# Patient Record
Sex: MALE | URBAN - METROPOLITAN AREA
[De-identification: names, ages, dates, MRNs, and addresses within clinical notes are randomized per-mention and may not be internally consistent; named-entity substitution may affect disease eponyms.]

---

## 2021-04-15 NOTE — PATIENT DISCUSSION
Recommend observation. [Wheezing] : wheezing [Cough] : cough [Joint Stiffness] : joint stiffness [Dizziness] : dizziness [Depression] : depression [Negative] : Heme/Lymph [Shortness Of Breath] : no shortness of breath [Joint Pain] : no joint pain [Muscle Pain] : no muscle pain [Muscle Weakness] : no muscle weakness [Headache] : no headaches [Fainting] : no fainting [Difficulty Walking] : no difficulty walking [Insomnia] : no insomnia [Suicidal] : not suicidal [Anxiety] : no anxiety

## 2021-04-20 NOTE — PATIENT DISCUSSION
ok to proceed with IOL due to [address] for sx made today with KMS and goal: CV -2.00. pt ed will likely need glasses for night driving to help with glare. Hx succ. MonoV with Lasik OS as near.

## 2021-04-22 NOTE — PATIENT DISCUSSION
No retinal detachment or retinal tear noted. Is This A New Presentation, Or A Follow-Up?: Skin Lesion How Severe Is Your Skin Lesion?: moderate Have Your Skin Lesions Been Treated?: not been treated

## 2021-04-23 NOTE — PATIENT DISCUSSION
----- Message from Maria Luz London sent at 7/25/2017 12:47 PM CDT -----  Contact: [facility]   Caller states that she need to know the date and results of pt A1C test and if one haven't been done in the last 3 months she need orders to have one done. Caller states that this is a medicare guideline that.   ..[phone]     1 week PO: Patient is doing well post-operatively. The importance of post-op drop compliance was emphasized. Drop scheduled reviewed with patient. Patient to call if any visual changes or concerns.

## 2021-09-10 ENCOUNTER — IMPORTED ENCOUNTER (OUTPATIENT)
Dept: URBAN - METROPOLITAN AREA CLINIC 31 | Facility: CLINIC | Age: 36
End: 2021-09-10

## 2021-09-10 PROBLEM — H46: Noted: 2021-09-10

## 2021-09-10 PROBLEM — H47.213: Noted: 2021-09-10

## 2021-09-10 PROCEDURE — 92083 EXTENDED VISUAL FIELD XM: CPT

## 2021-09-10 PROCEDURE — 92004 COMPRE OPH EXAM NEW PT 1/>: CPT

## 2021-09-10 NOTE — PATIENT DISCUSSION
Optic Atrophy OU  -- The patient has optic atrophy of both eyes which is the likely cause of his/her decreased vision. The condition was explained to the patient. MSHVF  - totally unreliable OS.  Makes no sense OU - severe bilateral constriction while CVF is fill bilaterally and ambulated without difficulty

## 2022-04-02 ASSESSMENT — TONOMETRY
OS_IOP_MMHG: 13
OD_IOP_MMHG: 14

## 2022-04-02 ASSESSMENT — VISUAL ACUITY
OS_SC: 20/50
OS_PH: SC 20/70
OS_CC: 20/100
OD_SC: 20/200

## 2023-10-30 ENCOUNTER — TELEPHONE ENCOUNTER (OUTPATIENT)
Dept: URBAN - METROPOLITAN AREA CLINIC 64 | Facility: CLINIC | Age: 38
End: 2023-10-30

## 2023-11-29 ENCOUNTER — TELEPHONE ENCOUNTER (OUTPATIENT)
Dept: URBAN - METROPOLITAN AREA CLINIC 63 | Facility: CLINIC | Age: 38
End: 2023-11-29

## 2023-12-04 ENCOUNTER — OFFICE VISIT (OUTPATIENT)
Dept: URBAN - METROPOLITAN AREA CLINIC 60 | Facility: CLINIC | Age: 38
End: 2023-12-04
Payer: MEDICARE

## 2023-12-04 VITALS
TEMPERATURE: 98 F | BODY MASS INDEX: 24.37 KG/M2 | HEART RATE: 59 BPM | SYSTOLIC BLOOD PRESSURE: 116 MMHG | DIASTOLIC BLOOD PRESSURE: 78 MMHG | WEIGHT: 160.8 LBS | OXYGEN SATURATION: 99 % | HEIGHT: 68 IN

## 2023-12-04 DIAGNOSIS — R19.4 CHANGE IN BOWEL HABITS: ICD-10-CM

## 2023-12-04 DIAGNOSIS — R63.4 UNINTENTIONAL WEIGHT LOSS: ICD-10-CM

## 2023-12-04 DIAGNOSIS — R13.10 DYSPHAGIA, UNSPECIFIED TYPE: ICD-10-CM

## 2023-12-04 DIAGNOSIS — R10.13 DYSPEPSIA: ICD-10-CM

## 2023-12-04 PROBLEM — 440630006: Status: ACTIVE | Noted: 2023-12-04

## 2023-12-04 PROBLEM — 40739000: Status: ACTIVE | Noted: 2023-12-04

## 2023-12-04 PROBLEM — 235595009: Status: ACTIVE | Noted: 2023-12-04

## 2023-12-04 PROCEDURE — 99204 OFFICE O/P NEW MOD 45 MIN: CPT

## 2023-12-04 RX ORDER — MIRTAZAPINE 15 MG/1
1 TABLET AT BEDTIME TABLET, FILM COATED ORAL ONCE A DAY
Status: ACTIVE | COMMUNITY

## 2023-12-04 RX ORDER — POLYETHYLENE GLYCOL 3350, SODIUM CHLORIDE, SODIUM BICARBONATE, POTASSIUM CHLORIDE 420; 11.2; 5.72; 1.48 G/4L; G/4L; G/4L; G/4L
AS DIRECTED POWDER, FOR SOLUTION ORAL ONCE
Qty: 4000 | Refills: 0 | OUTPATIENT
Start: 2023-12-04 | End: 2023-12-05

## 2023-12-04 RX ORDER — LINACLOTIDE 145 UG/1
1 CAPSULE AT LEAST 30 MINUTES BEFORE THE FIRST MEAL OF THE DAY ON AN EMPTY STOMACH CAPSULE, GELATIN COATED ORAL ONCE A DAY
Qty: 30 | Refills: 3 | OUTPATIENT
Start: 2023-12-04 | End: 2024-04-02

## 2023-12-04 RX ORDER — ONDANSETRON HYDROCHLORIDE 4 MG/1
1 TABLET TABLET, FILM COATED ORAL
Qty: 2 | Refills: 0 | OUTPATIENT
Start: 2023-12-04

## 2023-12-04 RX ORDER — OCRELIZUMAB 300 MG/10ML
AS DIRECTED INJECTION INTRAVENOUS
Status: ACTIVE | COMMUNITY
Start: 2023-12-04

## 2023-12-04 NOTE — HPI-TODAY'S VISIT:
Gabe is a 38-year-old male presenting to the office today for evaluation of change in bowel habits, dysphagia, excessive belching. For the past 2 to 3 months or more has had a lot of constipation. He has been having 1 bowel movement per week if that. Sometimes he will go almost 2 weeks without a bowel movement. He does not have formed stools when he has a bowel movement, they are liquidy/unformed-[place] stool 6 or 7. He has also lost 20 pounds unintentionally over the last 2-1/2 months. He has been eating less than he has in the past due to his symptoms. He also complains of excessive belching for the past 2 months. Along with this he has had dysphagia mostly worse with solid foods and he has vomited 1 time. A few hours after eating he feels as if food is going to come back up. He also has a history of acid reflux however he eliminated coffee and orange juice from his diet and has not had symptoms since then. He does have a history of multiple sclerosis with which she was diagnosed in 2009. He has no other complaints, questions, concerns. He denies a family history of colon cancer/colon polyps. Per patient he does not see a cardiologist or a pulmonologist. He he denies history of heart attack, stroke, pacemaker, defibrillator, stents, blood thinner use, COPD, asthma, sleep apnea, seizures, kidney disease. He is not diabetic and is not on a GLP-1 per patient. He denies melena, hematochezia, blood when wiping, odynophagia, hematemesis, abdominal mass, rectal mass. . 5/17/2023 CT abdomen/pelvis; impression; - No evidence of nephrolithiasis or hydronephrosis - Mild circumferential urinary bladder wall thickening as may be seen in cystitis. Recommend correlation with urinalysis. - Large amount of formed stool throughout the colon. Recommend correlation for signs and symptoms of constipation. - Nonspecific left more than right scrotal and testicular calcifications, probably postinfectious/inflammatory in nature. Moderate left hydrocele. . Discussed 9/21/2023 labs

## 2023-12-07 ENCOUNTER — OFFICE VISIT (OUTPATIENT)
Dept: URBAN - METROPOLITAN AREA SURGERY CENTER 4 | Facility: SURGERY CENTER | Age: 38
End: 2023-12-07
Payer: MEDICARE

## 2023-12-07 ENCOUNTER — CLAIMS CREATED FROM THE CLAIM WINDOW (OUTPATIENT)
Dept: URBAN - METROPOLITAN AREA CLINIC 4 | Facility: CLINIC | Age: 38
End: 2023-12-07
Payer: MEDICARE

## 2023-12-07 DIAGNOSIS — R12 HEARTBURN: ICD-10-CM

## 2023-12-07 DIAGNOSIS — R10.13 EPIGASTRIC ABDOMINAL PAIN: ICD-10-CM

## 2023-12-07 DIAGNOSIS — K31.89 OTHER DISEASES OF STOMACH AND DUODENUM: ICD-10-CM

## 2023-12-07 DIAGNOSIS — R10.13 EPIGASTRIC PAIN: ICD-10-CM

## 2023-12-07 DIAGNOSIS — K64.0 FIRST DEGREE HEMORRHOIDS: ICD-10-CM

## 2023-12-07 DIAGNOSIS — R19.4 CHANGE IN BOWEL HABITS: ICD-10-CM

## 2023-12-07 DIAGNOSIS — R19.4 CHANGE IN BOWEL HABIT: ICD-10-CM

## 2023-12-07 DIAGNOSIS — K30 FUNCTIONAL DYSPEPSIA: ICD-10-CM

## 2023-12-07 PROCEDURE — 43239 EGD BIOPSY SINGLE/MULTIPLE: CPT | Performed by: CLINIC/CENTER

## 2023-12-07 PROCEDURE — 45378 DIAGNOSTIC COLONOSCOPY: CPT | Performed by: INTERNAL MEDICINE

## 2023-12-07 PROCEDURE — 88305 TISSUE EXAM BY PATHOLOGIST: CPT | Performed by: PATHOLOGY

## 2023-12-07 PROCEDURE — 00813 ANES UPR LWR GI NDSC PX: CPT | Performed by: NURSE ANESTHETIST, CERTIFIED REGISTERED

## 2023-12-07 PROCEDURE — 43239 EGD BIOPSY SINGLE/MULTIPLE: CPT | Performed by: INTERNAL MEDICINE

## 2023-12-07 PROCEDURE — 45378 DIAGNOSTIC COLONOSCOPY: CPT | Performed by: CLINIC/CENTER

## 2023-12-07 RX ORDER — ONDANSETRON HYDROCHLORIDE 4 MG/1
1 TABLET TABLET, FILM COATED ORAL
Qty: 2 | Refills: 0 | Status: ACTIVE | COMMUNITY
Start: 2023-12-04

## 2023-12-07 RX ORDER — OCRELIZUMAB 300 MG/10ML
AS DIRECTED INJECTION INTRAVENOUS
Status: ACTIVE | COMMUNITY
Start: 2023-12-04

## 2023-12-07 RX ORDER — MIRTAZAPINE 15 MG/1
1 TABLET AT BEDTIME TABLET, FILM COATED ORAL ONCE A DAY
Status: ACTIVE | COMMUNITY

## 2023-12-07 RX ORDER — LINACLOTIDE 145 UG/1
1 CAPSULE AT LEAST 30 MINUTES BEFORE THE FIRST MEAL OF THE DAY ON AN EMPTY STOMACH CAPSULE, GELATIN COATED ORAL ONCE A DAY
Qty: 30 | Refills: 3 | Status: ACTIVE | COMMUNITY
Start: 2023-12-04 | End: 2024-04-02

## 2023-12-10 NOTE — PHYSICAL EXAM GASTROINTESTINAL
Abdomen , soft, nontender, nondistended , no guarding or rigidity , no masses palpable , normal bowel sounds , Liver and Spleen , no hepatomegaly present , no hepatosplenomegaly , liver nontender , spleen not palpable Clear bilaterally, pupils equal, round and reactive to accomodation. Visual fields intact x 4 quadrants.

## 2023-12-11 ENCOUNTER — LAB OUTSIDE AN ENCOUNTER (OUTPATIENT)
Dept: URBAN - METROPOLITAN AREA CLINIC 60 | Facility: CLINIC | Age: 38
End: 2023-12-11

## 2023-12-21 ENCOUNTER — LAB OUTSIDE AN ENCOUNTER (OUTPATIENT)
Dept: URBAN - METROPOLITAN AREA CLINIC 60 | Facility: CLINIC | Age: 38
End: 2023-12-21

## 2023-12-21 ENCOUNTER — OFFICE VISIT (OUTPATIENT)
Dept: URBAN - METROPOLITAN AREA CLINIC 60 | Facility: CLINIC | Age: 38
End: 2023-12-21
Payer: MEDICARE

## 2023-12-21 VITALS
SYSTOLIC BLOOD PRESSURE: 118 MMHG | DIASTOLIC BLOOD PRESSURE: 74 MMHG | BODY MASS INDEX: 23.58 KG/M2 | HEIGHT: 68 IN | OXYGEN SATURATION: 98 % | TEMPERATURE: 98.1 F | RESPIRATION RATE: 20 BRPM | WEIGHT: 155.6 LBS | HEART RATE: 70 BPM

## 2023-12-21 DIAGNOSIS — R10.13 DYSPEPSIA: ICD-10-CM

## 2023-12-21 DIAGNOSIS — R68.81 EARLY SATIETY: ICD-10-CM

## 2023-12-21 DIAGNOSIS — K21.9 GERD WITHOUT ESOPHAGITIS: ICD-10-CM

## 2023-12-21 DIAGNOSIS — K58.1 IRRITABLE BOWEL SYNDROME WITH CONSTIPATION: ICD-10-CM

## 2023-12-21 PROCEDURE — 99214 OFFICE O/P EST MOD 30 MIN: CPT

## 2023-12-21 RX ORDER — OCRELIZUMAB 300 MG/10ML
AS DIRECTED INJECTION INTRAVENOUS
Status: ACTIVE | COMMUNITY
Start: 2023-12-04

## 2023-12-21 RX ORDER — MIRTAZAPINE 15 MG/1
1 TABLET AT BEDTIME TABLET, FILM COATED ORAL ONCE A DAY
Status: ACTIVE | COMMUNITY

## 2023-12-21 RX ORDER — LINACLOTIDE 145 UG/1
1 CAPSULE AT LEAST 30 MINUTES BEFORE THE FIRST MEAL OF THE DAY ON AN EMPTY STOMACH CAPSULE, GELATIN COATED ORAL ONCE A DAY
Qty: 30 | Refills: 3 | Status: ACTIVE | COMMUNITY
Start: 2023-12-04 | End: 2024-04-02

## 2023-12-21 NOTE — HPI-TODAY'S VISIT:
Gabe is a 38-year-old male presenting to the office today for follow-up after colonoscopy and EGD. Colonoscopy and EGD were completed on 12/7/2023 and results are discussed in detail below. He was seen on 12/4/2023 with complaints of constipation and dyspepsia along with unintentional weight loss. Belching and dyspepsia continues to be a problem for the patient without EGD/colonoscopy findings that would explain symptoms. He does complain of lots of bloating/gas as well. He also complains of early satiety and feels like shortly after he starts eating he is full. He also complains that he does not want to eat and has difficulty getting himself to eat. As far as weight loss goes he noticed weight loss when he stopped eating as much food. However, he has gained back 5 pounds since his last visit. Constipation continues to be a problem. He is moving his bowels every other day and notices that this may relieve some abdominal pain sometimes. He has not started Linzess which was prescribed at last visit, he has also not been taking fiber. He has no other complaints, questions, concerns. He denies further weight loss, nausea/vomiting, hematemesis, dysphagia, reflux, diarrhea, hematochezia, melena, bright red blood per rectum. . . Colonoscopy 12/7/2023; - Nonbleeding internal hemorrhoids - The examined portion of the ileum was normal - No specimens collected - Repeat colonoscopy at age 46 for screening purposes . EGD 12/7/2023; - Normal esophagus - Normal stomach, biopsy - Normal duodenal bulb and second portion of the duodenum, biopsied - Pathology has not returned yet . Discussed labs 9/21/2023 . Discussed CT ABD/Pelvis 5/17/2023

## 2024-01-08 ENCOUNTER — TELEPHONE ENCOUNTER (OUTPATIENT)
Dept: URBAN - METROPOLITAN AREA CLINIC 63 | Facility: CLINIC | Age: 39
End: 2024-01-08

## 2024-03-21 ENCOUNTER — OV EP (OUTPATIENT)
Dept: URBAN - METROPOLITAN AREA CLINIC 60 | Facility: CLINIC | Age: 39
End: 2024-03-21